# Patient Record
Sex: MALE | Race: OTHER | Employment: OTHER | URBAN - METROPOLITAN AREA
[De-identification: names, ages, dates, MRNs, and addresses within clinical notes are randomized per-mention and may not be internally consistent; named-entity substitution may affect disease eponyms.]

---

## 2017-03-06 NOTE — PATIENT DISCUSSION
AMD (DRY), OU__:  PRESCRIBE AREDS 2 VITAMINS / AMSLER GRID QD/ UV PROTECTION. SMOKING CESSATION EMPHASIZED. RETURN FOR FOLLOW-UP AS SCHEDULED.

## 2017-03-06 NOTE — PATIENT DISCUSSION
POSTERIOR CAPSULAR FIBROSIS, OU_ - VISUALLY SIGNIFICANT. SCHEDULE YAG CAPSULOTOMY OD___ FIRST THEN LATER YAG CAPSULOTOMY OS___ IF VISUAL SYMPTOMS PERSIST.

## 2017-03-06 NOTE — PATIENT DISCUSSION
Posterior Capsular Fibrosis Counseling: The diagnosis of posterior capsular fibrosis (PCF), also referred to as a secondary cataract or posterior capsular opacification (PCO), was discussed with the patient. The patient understands that their symptoms and limitations are likely related to this condition. I have reviewed the risks, benefits and alternatives of  YAG laser surgery for the treatment of the fibrosis. The uncommon risk of an increase in intraocular pressure or a retinal detachment and their associated symptoms were explained to the patient. The patient understands and desires to proceed with the laser surgery to improve their vision for __READING___.

## 2018-01-29 NOTE — PATIENT DISCUSSION
AMD (Dry Drusen) Counseling:   I have explained the diagnosis of macular drusen. The patient was advised of the importance of annual dilated eye exams. Return for follow-up as scheduled.

## 2019-03-01 NOTE — PATIENT DISCUSSION
Resduced myopia OD for near and AARON OS at distance. Trial SCL to improve vision then probable LASIK.

## 2019-03-12 NOTE — PATIENT DISCUSSION
Patient prefers vision at distance and near with contacts. PO with Dr. Emeli Balbuena for probable LASIK OU, goal -2.00/-2.25 OD and raulito OS.

## 2019-03-28 NOTE — PATIENT DISCUSSION
Patient prefers vision at distance and near with contacts. PO with Dr. Vogt Point for probable LASIK OU, goal -2.00/-2.25 OD and raulito OS.

## 2019-03-29 NOTE — PATIENT DISCUSSION
Patient prefers vision at distance and near with contacts. PO with Dr. Markus Selby for probable LASIK OU, goal -2.00/-2.25 OD and raulito OS.

## 2019-04-05 NOTE — PATIENT DISCUSSION
Patient prefers vision at distance and near with contacts. PO with Dr. Rio Rodriguez for probable LASIK OU, goal -2.00/-2.25 OD and raulito OS.

## 2019-04-05 NOTE — PATIENT DISCUSSION
Good postoperative appearance. Removed BSCL OU and stop drops Start Denise laura Qhs OU for 6 months.

## 2019-04-24 NOTE — PATIENT DISCUSSION
Patient prefers vision at distance and near with contacts. PO with Dr. Rebecca Silva for probable LASIK OU, goal -2.00/-2.25 OD and raulito OS.

## 2019-05-24 NOTE — PATIENT DISCUSSION
Patient prefers vision at distance and near with contacts. PO with Dr. Rebecca Sliva for probable LASIK OU, goal -2.00/-2.25 OD and raulito OS.

## 2019-06-21 NOTE — PATIENT DISCUSSION
Patient prefers vision at distance and near with contacts. PO with Dr. Akiko Barrientos for probable LASIK OU, goal -2.00/-2.25 OD and raulito OS.

## 2019-09-17 NOTE — PATIENT DISCUSSION
Posterior Capsular Fibrosis Counseling: The diagnosis of posterior capsular fibrosis (PCF), also referred to as a secondary cataract or posterior capsular opacification (PCO), was discussed with the patient. The patient understands that their symptoms and limitations are likely related to this condition. I have reviewed the risks, benefits and alternatives of  YAG laser surgery for the treatment of the fibrosis. The uncommon risk of an increase in intraocular pressure or a retinal detachment and their associated symptoms were explained to the patient. The patient understands and desires to proceed with the laser surgery to improve their vision for ____TV, READING_______.

## 2019-12-16 NOTE — PATIENT DISCUSSION
New Prescription: TobraDex (tobramycin-dexamethasone): drops,suspension: 0.3-0.1% 1 drop four times a day as directed into both eyes 12-

## 2019-12-18 NOTE — PATIENT DISCUSSION
"BLEPHARITIS, OU: PRESCRIBE WARM COMPRESSES AND EYELID SCRUBS QD-BID, ARTIFICIAL TEARS BID-QID, THE DAILY INTAKE OF OMEGA-3 FATTY ACIDS. WILL CONSIDER LIPIFLOW TREATMENT NEXT VISIT IF NOT RESPONSIVE TO TREATMENT OR IF SYMPTOMS PERSIST.  RETURN FOR FOLLOW-UP AS SCHEDULED.",
[id number],2019-12-18 00:00:00.000,General Discussion,NULL,[id number],"TOBRADEX: 4- 4 DAYS

## 2019-12-18 NOTE — PATIENT DISCUSSION
Continue: TobraDex (tobramycin-dexamethasone): drops,suspension: 0.3-0.1% 1 drop four times a day as directed into both eyes 12-

## 2019-12-18 NOTE — PATIENT DISCUSSION
BLEPHAROCONJUNCTIVITIS, OU, IMPROVING. PT EDUCATION. RX ZYLET / TOBRADEX QID X 4 DAYS, TID X 3 DAYS, BID THEREAFTER OU. MONITOR. RTO AS SCHEDULED.

## 2019-12-20 NOTE — PATIENT DISCUSSION
Patient prefers vision at distance and near with contacts. PO with Dr. Jag Cardozo for probable LASIK OU, goal -2.00/-2.25 OD and raulito OS.

## 2019-12-20 NOTE — PATIENT DISCUSSION
[facility]         cc:   MELANIE Greene M.D.      PREOPERATIVE DIAGNOSIS:   ACUTE CHOLECYSTITIS AND CHOLELITHIASIS WITH OBSTRUCTION.      POSTOPERATIVE DIAGNOSIS:   Acute gangrenous cholecystitis with obstruction and cholelithiasis.      PROCEDURE:   LAPAROSCOPIC CHOLECYSTECTOMY.  VERY DIFFICULT OPERATION, TREMENDOUS SCARRING,   LACK OF PLANE BETWEEN GALLBLADDER AND LIVER, THEREFORE REQUIRES MODIFIER 22.   DESPITE OF ALL THE DIFFICULTIES, WE WERE ABLE TO ACCOMPLISH LAPAROSCOPY   WITHOUT ANY WEAK MEASURES, SUBCOSTAL INCISION FOR THIS PATIENT IS A 79-YEAR-   OLD, HIGH RISK FOR OPEN CHOLECYSTECTOMY.      SURGEON:   GIO BATES M.D.      ASSISTANT:   MAXIMINO RUIZ M.D.      FINDING:   Acute gangrenous cholecystitis and cholelithiasis with obstruction.      DESCRIPTION OF PROCEDURE:   After routine preparation, the patient was prepped and draped under general   anesthesia in supine position.  The patient came with a Shaw catheter, has a   history of atrial fibrillation and CVA, had this Shaw catheter placed before   the operation.  The stomach was decompressed with NG tube.  The umbilicus was   not used for insufflation because of previous midline incision.  Therefore,   subxiphoid incision was made just to the right of midline below the xiphoid   after skin infiltrated with 0.25% Marcaine.  Subcu was incised.  Fascia was   incised.  Transversalis was incised.  Peritoneal cavity entered.  A 12 mm port   was placed.  Connected to insufflator, 14 mmHg insufflation obtained.  Scope   was inserted under direct vision.  The entire lower abdomen was plastered with   adhesions to the right of the umbilicus and left midclavicular line, a 5-mm   port was placed under direct vision.  Then, the scope was transferred to the   paraumbilical port site from the subxiphoid space.  Two 5-mm ports were placed   in right anterior axillary line at the level  Recommended artificial tears and warm compresses. of umbilicus.  Second 5-mm port   was placed in midclavicular line 2 cm below the costal margin.  After placing   all the ports under direct vision, transversus abdominis plane nerve block was   performed with 0.25% Marcaine.  The patient was placed in reverse   Trendelenburg, rotated to the left.  Some omental adhesion was taken down.   Gallbladder was grasped, which was very inflamed.  Mike hepatis was difficult   to dissect, proximally common bile duct.  Very careful dissection, we were   able to identify the cystic artery and then cystic gallbladder junction,   encircled, critical view was demonstrated.  Then, cystic artery was clipped x2   proximally, x1 distally, and transected with Harmonic Ace.  Then, critical   view better visualized and dissected and clipped with V-Loc extra large x2   proximally, x1 distally, and transected.  A major part of the gallbladder bed   lack of plane and very difficult to get the entire gallbladder off the liver   bed without any major bleeding.  After complete detachment of gallbladder from   the liver bed, placed an EndoCatch and removed through the subxiphoid port   site.  After removal of gallbladder, port was inserted for irrigation and                              Operative Report - 1   aspiration of subglottic, subphrenic, and right gutter which was performed.   After completion of irrigation, aspiration, and inspection, 10 mm Philip-   Irving was placed in Suresh pouch, brought out through right anterior   axillary port site.  After completion of irrigation, aspiration, inspection;   all the ports were removed under direct vision.  The transversalis and   peritoneum were closed with subxiphoid space with Vicryl #0.  Fascia was   closed with Vicryl #0.  Skin was closed with skin stapler.  All the skin   incisions were closed with interrupted 4-0 Monocryl.  Each layer was   infiltrated with 0.25% Marcaine.  Dressing was applied.  The patient tolerated   the procedure  well under general anesthesia.  Left the operating to recovery   in good condition.      _______________________________________   Troy Zavala M.D.      CHADWICK OLGUINVA   MRN#:  [id number]   ACCT#:  [id number]   DATE OF SURGERY:  07/05/2017   ROOM:  [facility]   SVC:  MED   DICTATED BY: Troy Zavala M.D.   DD: 07/05/2017 DT: 07/05/2017 TD: 20:38 TT: 21:11 K#: [id number]                                    REPORT OF OPERATION                           Operative Report - 2

## 2019-12-31 NOTE — PATIENT DISCUSSION
Stopped Today: TobraDex (tobramycin-dexamethasone): drops,suspension: 0.3-0.1% 1 drop four times a day as directed into both eyes 12-

## 2020-07-28 NOTE — PATIENT DISCUSSION
Fuchs' Endothelial Dystrophy Counseling: The diagnosis and its pathophysiology was explained to the patient. The patient was counseled on the prescribed medical treatment and the early morning blurring of vision which will improve as the day progresses. The patient was advised of the possibility of decreased vision over time secondary to corneal edema. The possible need for a future cornea transplant was explained. Return for follow-up as scheduled.

## 2020-12-22 NOTE — PATIENT DISCUSSION
Patient prefers vision at distance and near with contacts. PO with Dr. Julian Cos for probable LASIK OU, goal -2.00/-2.25 OD and raulito OS.

## 2022-02-10 ENCOUNTER — PREPPED CHART (OUTPATIENT)
Dept: URBAN - METROPOLITAN AREA CLINIC 10 | Facility: CLINIC | Age: 67
End: 2022-02-10

## 2022-06-23 ENCOUNTER — ESTABLISHED PATIENT (OUTPATIENT)
Dept: URBAN - METROPOLITAN AREA CLINIC 10 | Facility: CLINIC | Age: 67
End: 2022-06-23

## 2022-06-23 DIAGNOSIS — H43.313: ICD-10-CM

## 2022-06-23 DIAGNOSIS — H25.13: ICD-10-CM

## 2022-06-23 DIAGNOSIS — H52.03: ICD-10-CM

## 2022-06-23 PROCEDURE — 92014 COMPRE OPH EXAM EST PT 1/>: CPT

## 2022-06-23 PROCEDURE — 92015 DETERMINE REFRACTIVE STATE: CPT

## 2022-06-23 ASSESSMENT — TONOMETRY
OD_IOP_MMHG: 16
OS_IOP_MMHG: 16

## 2022-06-23 ASSESSMENT — KERATOMETRY
OS_K1POWER_DIOPTERS: 41.75
OD_K2POWER_DIOPTERS: 42.00
OD_K1POWER_DIOPTERS: 41.25
OD_AXISANGLE_DEGREES: 155
OS_AXISANGLE_DEGREES: 155
OD_AXISANGLE2_DEGREES: 65
OS_K2POWER_DIOPTERS: 42.25
OS_AXISANGLE2_DEGREES: 65

## 2022-06-23 ASSESSMENT — VISUAL ACUITY
OU_CC: 20/30+2
OD_CC: 20/30-3
OS_CC: 20/30+2

## 2022-11-02 NOTE — PATIENT DISCUSSION
Patient prefers vision at distance and near with contacts. PO with Dr. Sarina Kay for probable LASIK OU, goal -2.00/-2.25 OD and raulito OS.

## 2023-06-28 ASSESSMENT — KERATOMETRY
OS_K1POWER_DIOPTERS: 41.75
OS_AXISANGLE2_DEGREES: 65
OD_K1POWER_DIOPTERS: 41.25
OS_AXISANGLE_DEGREES: 155
OS_K2POWER_DIOPTERS: 42.25
OD_AXISANGLE2_DEGREES: 65
OD_K2POWER_DIOPTERS: 42.00
OD_AXISANGLE_DEGREES: 155

## 2023-06-29 ENCOUNTER — PREPPED CHART (OUTPATIENT)
Dept: URBAN - METROPOLITAN AREA CLINIC 10 | Facility: CLINIC | Age: 68
End: 2023-06-29

## 2023-08-03 ENCOUNTER — PREPPED CHART (OUTPATIENT)
Dept: URBAN - METROPOLITAN AREA CLINIC 10 | Facility: CLINIC | Age: 68
End: 2023-08-03

## 2024-08-22 NOTE — PATIENT DISCUSSION
Discussed the importance of blood sugar control in the prevention of ocular complications.
Discussed the risks/benefits of laser capsulotomy. Laser recommended. Patient elects to proceed.
Glasses Rx given.
LATEX ALLERGY.
Monitor for increased risk corneal edema.
Monitor.
No active Diabetic Retinopathy is present on examination.
Patient reassurance. Self limiting. Artificial Tears as instructed.
Patient understands condition, prognosis and need for follow up care.
Recommended observation.
Recommended yearly dilated eye examinations.
Stable.
[facility]
